# Patient Record
Sex: FEMALE | Race: ASIAN | NOT HISPANIC OR LATINO | Employment: OTHER | URBAN - METROPOLITAN AREA
[De-identification: names, ages, dates, MRNs, and addresses within clinical notes are randomized per-mention and may not be internally consistent; named-entity substitution may affect disease eponyms.]

---

## 2019-10-15 ENCOUNTER — APPOINTMENT (OUTPATIENT)
Dept: PREADMISSION TESTING | Facility: HOSPITAL | Age: 34
End: 2019-10-15

## 2019-10-15 VITALS
DIASTOLIC BLOOD PRESSURE: 70 MMHG | WEIGHT: 128 LBS | SYSTOLIC BLOOD PRESSURE: 109 MMHG | RESPIRATION RATE: 18 BRPM | OXYGEN SATURATION: 100 % | HEIGHT: 61 IN | TEMPERATURE: 98.3 F | HEART RATE: 73 BPM | BODY MASS INDEX: 24.17 KG/M2

## 2019-10-15 LAB
DEPRECATED RDW RBC AUTO: 41.3 FL (ref 37–54)
ERYTHROCYTE [DISTWIDTH] IN BLOOD BY AUTOMATED COUNT: 13.7 % (ref 12.3–15.4)
HCG SERPL QL: NEGATIVE
HCT VFR BLD AUTO: 44.4 % (ref 34–46.6)
HGB BLD-MCNC: 14.2 G/DL (ref 12–15.9)
MCH RBC QN AUTO: 26.6 PG (ref 26.6–33)
MCHC RBC AUTO-ENTMCNC: 32 G/DL (ref 31.5–35.7)
MCV RBC AUTO: 83.1 FL (ref 79–97)
PLATELET # BLD AUTO: 321 10*3/MM3 (ref 140–450)
PMV BLD AUTO: 10.3 FL (ref 6–12)
RBC # BLD AUTO: 5.34 10*6/MM3 (ref 3.77–5.28)
WBC NRBC COR # BLD: 7.13 10*3/MM3 (ref 3.4–10.8)

## 2019-10-15 PROCEDURE — 84703 CHORIONIC GONADOTROPIN ASSAY: CPT | Performed by: SURGERY

## 2019-10-15 PROCEDURE — 36415 COLL VENOUS BLD VENIPUNCTURE: CPT

## 2019-10-15 PROCEDURE — 85027 COMPLETE CBC AUTOMATED: CPT | Performed by: SURGERY

## 2019-10-15 NOTE — DISCHARGE INSTRUCTIONS
Take the following medications the morning of surgery with a small sip of water: NONE    ARRIVE AT 1030 AM    General Instructions:  • Do not eat solid food after midnight the night before surgery.  • You may drink clear liquids day of surgery but must stop at least one hour before your hospital arrival time.  • It is beneficial for you to have a clear drink that contains carbohydrates the day of surgery.  We suggest a 12 to 20 ounce bottle of Gatorade or Powerade for non-diabetic patients or a 12 to 20 ounce bottle of G2 or Powerade Zero for diabetic patients. (Pediatric patients, are not advised to drink a 12 to 20 ounce carbohydrate drink)    Clear liquids are liquids you can see through.  Nothing red in color.     Plain water                               Sports drinks  Sodas                                   Gelatin (Jell-O)  Fruit juices without pulp such as white grape juice and apple juice  Popsicles that contain no fruit or yogurt  Tea or coffee (no cream or milk added)  Gatorade / Powerade  G2 / Powerade Zero    • Infants may have breast milk up to four hours before surgery.  • Infants drinking formula may drink formula up to six hours before surgery.   • Patients who avoid smoking, chewing tobacco and alcohol for 4 weeks prior to surgery have a reduced risk of post-operative complications.  Quit smoking as many days before surgery as you can.  • Do not smoke, use chewing tobacco or drink alcohol the day of surgery.   • If applicable bring your C-PAP/ BI-PAP machine.  • Bring any papers given to you in the doctor’s office.  • Wear clean comfortable clothes.  • Do not wear contact lenses, false eyelashes or make-up.  Bring a case for your glasses.   • Bring crutches or walker if applicable.  • Remove all piercings.  Leave jewelry and any other valuables at home.  • Hair extensions with metal clips must be removed prior to surgery.  • The Pre-Admission Testing nurse will instruct you to bring medications if  unable to obtain an accurate list in Pre-Admission Testing.        If you were given a blood bank ID arm band remember to bring it with you the day of surgery.    Preventing a Surgical Site Infection:  • For 2 to 3 days before surgery, avoid shaving with a razor because the razor can irritate skin and make it easier to develop an infection.    • Any areas of open skin can increase the risk of a post-operative wound infection by allowing bacteria to enter and travel throughout the body.  Notify your surgeon if you have any skin wounds / rashes even if it is not near the expected surgical site.  The area will need assessed to determine if surgery should be delayed until it is healed.  • The night prior to surgery sleep in a clean bed with clean clothing.  Do not allow pets to sleep with you.  • Shower on the morning of surgery using a fresh bar of anti-bacterial soap (such as Dial) and clean washcloth.  Dry with a clean towel and dress in clean clothing.  • Ask your surgeon if you will be receiving antibiotics prior to surgery.  • Make sure you, your family, and all healthcare providers clean their hands with soap and water or an alcohol based hand  before caring for you or your wound.    Day of surgery:  Your arrival time is approximately two hours before your scheduled surgery time.  Upon arrival, a Pre-op nurse and Anesthesiologist will review your health history, obtain vital signs, and answer questions you may have.  The only belongings needed at this time will be a list of your home medications and if applicable your C-PAP/BI-PAP machine.  If you are staying overnight your family can leave the rest of your belongings in the car and bring them to your room later.  A Pre-op nurse will start an IV and you may receive medication in preparation for surgery, including something to help you relax.  Your family will be able to see you in the Pre-op area.  Two visitors at a time will be allowed in the Pre-op  room.  While you are in surgery your family should notify the waiting room  if they leave the waiting room area and provide a contact phone number.    Please be aware that surgery does come with discomfort.  We want to make every effort to control your discomfort so please discuss any uncontrolled symptoms with your nurse.   Your doctor will most likely have prescribed pain medications.      If you are going home after surgery you will receive individualized written care instructions before being discharged.  A responsible adult must drive you to and from the hospital on the day of your surgery and stay with you for 24 hours.    If you are staying overnight following surgery, you will be transported to your hospital room following the recovery period.  [facility] has all private rooms.    You have received a list of surgical assistants for your reference.  If you have any questions please call Pre-Admission Testing at [phone].  Deductibles and co-payments are collected on the day of service. Please be prepared to pay the required co-pay, deductible or deposit on the day of service as defined by your plan.

## 2019-10-21 ENCOUNTER — ANESTHESIA (OUTPATIENT)
Dept: PERIOP | Facility: HOSPITAL | Age: 34
End: 2019-10-21

## 2019-10-21 ENCOUNTER — ANESTHESIA EVENT (OUTPATIENT)
Dept: PERIOP | Facility: HOSPITAL | Age: 34
End: 2019-10-21

## 2019-10-21 ENCOUNTER — HOSPITAL ENCOUNTER (OUTPATIENT)
Facility: HOSPITAL | Age: 34
Setting detail: HOSPITAL OUTPATIENT SURGERY
Discharge: HOME | End: 2019-10-21
Attending: SURGERY | Admitting: SURGERY

## 2019-10-21 VITALS
RESPIRATION RATE: 18 BRPM | OXYGEN SATURATION: 93 % | TEMPERATURE: 98 F | BODY MASS INDEX: 24.02 KG/M2 | HEIGHT: 61 IN | DIASTOLIC BLOOD PRESSURE: 70 MMHG | WEIGHT: 127.21 LBS | HEART RATE: 105 BPM | SYSTOLIC BLOOD PRESSURE: 108 MMHG

## 2019-10-21 PROCEDURE — 25010000002 ONDANSETRON PER 1 MG: Performed by: NURSE ANESTHETIST, CERTIFIED REGISTERED

## 2019-10-21 PROCEDURE — 25010000002 PROPOFOL 10 MG/ML EMULSION: Performed by: NURSE ANESTHETIST, CERTIFIED REGISTERED

## 2019-10-21 PROCEDURE — 25010000002 NEOSTIGMINE PER 0.5 MG: Performed by: NURSE ANESTHETIST, CERTIFIED REGISTERED

## 2019-10-21 PROCEDURE — 25010000002 FENTANYL CITRATE (PF) 100 MCG/2ML SOLUTION: Performed by: NURSE ANESTHETIST, CERTIFIED REGISTERED

## 2019-10-21 PROCEDURE — 25010000002 ROPIVACAINE PER 1 MG: Performed by: SURGERY

## 2019-10-21 PROCEDURE — 25010000003 CEFAZOLIN PER 500 MG: Performed by: SURGERY

## 2019-10-21 PROCEDURE — 25010000002 MIDAZOLAM PER 1 MG: Performed by: ANESTHESIOLOGY

## 2019-10-21 PROCEDURE — C1789 PROSTHESIS, BREAST, IMP: HCPCS | Performed by: SURGERY

## 2019-10-21 PROCEDURE — 25010000002 GENTAMICIN PER 80 MG: Performed by: SURGERY

## 2019-10-21 PROCEDURE — 25010000002 DEXAMETHASONE PER 1 MG: Performed by: SURGERY

## 2019-10-21 DEVICE — SALINE BREAST IMPLANT WITH DIAPHRAGM VALVE, 300CC  SMOOTH ROUND SALINE
Type: IMPLANTABLE DEVICE | Site: BREAST | Status: FUNCTIONAL
Brand: MENTOR SMOOTH ROUND MODERATE PROFILE

## 2019-10-21 RX ORDER — FENTANYL CITRATE 50 UG/ML
50 INJECTION, SOLUTION INTRAMUSCULAR; INTRAVENOUS
Status: DISCONTINUED | OUTPATIENT
Start: 2019-10-21 | End: 2019-10-21 | Stop reason: HOSPADM

## 2019-10-21 RX ORDER — HYDROMORPHONE HYDROCHLORIDE 1 MG/ML
0.5 INJECTION, SOLUTION INTRAMUSCULAR; INTRAVENOUS; SUBCUTANEOUS
Status: DISCONTINUED | OUTPATIENT
Start: 2019-10-21 | End: 2019-10-21 | Stop reason: HOSPADM

## 2019-10-21 RX ORDER — PROMETHAZINE HYDROCHLORIDE 25 MG/ML
12.5 INJECTION, SOLUTION INTRAMUSCULAR; INTRAVENOUS ONCE AS NEEDED
Status: DISCONTINUED | OUTPATIENT
Start: 2019-10-21 | End: 2019-10-21 | Stop reason: HOSPADM

## 2019-10-21 RX ORDER — ACETAMINOPHEN 325 MG/1
650 TABLET ORAL ONCE AS NEEDED
Status: DISCONTINUED | OUTPATIENT
Start: 2019-10-21 | End: 2019-10-21 | Stop reason: HOSPADM

## 2019-10-21 RX ORDER — PROMETHAZINE HYDROCHLORIDE 25 MG/ML
6.25 INJECTION, SOLUTION INTRAMUSCULAR; INTRAVENOUS
Status: DISCONTINUED | OUTPATIENT
Start: 2019-10-21 | End: 2019-10-21 | Stop reason: HOSPADM

## 2019-10-21 RX ORDER — CELECOXIB 200 MG/1
200 CAPSULE ORAL ONCE
Status: COMPLETED | OUTPATIENT
Start: 2019-10-21 | End: 2019-10-21

## 2019-10-21 RX ORDER — FAMOTIDINE 10 MG/ML
20 INJECTION, SOLUTION INTRAVENOUS ONCE
Status: COMPLETED | OUTPATIENT
Start: 2019-10-21 | End: 2019-10-21

## 2019-10-21 RX ORDER — ROCURONIUM BROMIDE 10 MG/ML
INJECTION, SOLUTION INTRAVENOUS AS NEEDED
Status: DISCONTINUED | OUTPATIENT
Start: 2019-10-21 | End: 2019-10-21 | Stop reason: SURG

## 2019-10-21 RX ORDER — LIDOCAINE HYDROCHLORIDE 20 MG/ML
INJECTION, SOLUTION INFILTRATION; PERINEURAL AS NEEDED
Status: DISCONTINUED | OUTPATIENT
Start: 2019-10-21 | End: 2019-10-21 | Stop reason: SURG

## 2019-10-21 RX ORDER — SODIUM CHLORIDE, SODIUM LACTATE, POTASSIUM CHLORIDE, CALCIUM CHLORIDE 600; 310; 30; 20 MG/100ML; MG/100ML; MG/100ML; MG/100ML
9 INJECTION, SOLUTION INTRAVENOUS CONTINUOUS
Status: DISCONTINUED | OUTPATIENT
Start: 2019-10-21 | End: 2019-10-21 | Stop reason: HOSPADM

## 2019-10-21 RX ORDER — MIDAZOLAM HYDROCHLORIDE 1 MG/ML
2 INJECTION INTRAMUSCULAR; INTRAVENOUS
Status: DISCONTINUED | OUTPATIENT
Start: 2019-10-21 | End: 2019-10-21 | Stop reason: HOSPADM

## 2019-10-21 RX ORDER — HYDROCODONE BITARTRATE AND ACETAMINOPHEN 7.5; 325 MG/1; MG/1
1 TABLET ORAL ONCE AS NEEDED
Status: DISCONTINUED | OUTPATIENT
Start: 2019-10-21 | End: 2019-10-21 | Stop reason: HOSPADM

## 2019-10-21 RX ORDER — MIDAZOLAM HYDROCHLORIDE 1 MG/ML
1 INJECTION INTRAMUSCULAR; INTRAVENOUS
Status: DISCONTINUED | OUTPATIENT
Start: 2019-10-21 | End: 2019-10-21 | Stop reason: HOSPADM

## 2019-10-21 RX ORDER — HYDRALAZINE HYDROCHLORIDE 20 MG/ML
5 INJECTION INTRAMUSCULAR; INTRAVENOUS
Status: DISCONTINUED | OUTPATIENT
Start: 2019-10-21 | End: 2019-10-21 | Stop reason: HOSPADM

## 2019-10-21 RX ORDER — CYCLOBENZAPRINE HCL 10 MG
10 TABLET ORAL ONCE
Status: COMPLETED | OUTPATIENT
Start: 2019-10-21 | End: 2019-10-21

## 2019-10-21 RX ORDER — CELECOXIB 200 MG/1
400 CAPSULE ORAL ONCE
Status: COMPLETED | OUTPATIENT
Start: 2019-10-21 | End: 2019-10-21

## 2019-10-21 RX ORDER — PROMETHAZINE HYDROCHLORIDE 25 MG/1
25 SUPPOSITORY RECTAL ONCE AS NEEDED
Status: DISCONTINUED | OUTPATIENT
Start: 2019-10-21 | End: 2019-10-21 | Stop reason: HOSPADM

## 2019-10-21 RX ORDER — ACETAMINOPHEN 325 MG/1
975 TABLET ORAL ONCE
Status: COMPLETED | OUTPATIENT
Start: 2019-10-21 | End: 2019-10-21

## 2019-10-21 RX ORDER — SODIUM CHLORIDE 0.9 % (FLUSH) 0.9 %
3 SYRINGE (ML) INJECTION EVERY 12 HOURS SCHEDULED
Status: DISCONTINUED | OUTPATIENT
Start: 2019-10-21 | End: 2019-10-21 | Stop reason: HOSPADM

## 2019-10-21 RX ORDER — CLINDAMYCIN PHOSPHATE 900 MG/50ML
900 INJECTION INTRAVENOUS ONCE
Status: COMPLETED | OUTPATIENT
Start: 2019-10-21 | End: 2019-10-21

## 2019-10-21 RX ORDER — SODIUM CHLORIDE 9 MG/ML
INJECTION, SOLUTION INTRAVENOUS AS NEEDED
Status: DISCONTINUED | OUTPATIENT
Start: 2019-10-21 | End: 2019-10-21 | Stop reason: HOSPADM

## 2019-10-21 RX ORDER — PROMETHAZINE HYDROCHLORIDE 25 MG/1
12.5 TABLET ORAL ONCE AS NEEDED
Status: DISCONTINUED | OUTPATIENT
Start: 2019-10-21 | End: 2019-10-21 | Stop reason: HOSPADM

## 2019-10-21 RX ORDER — ONDANSETRON 2 MG/ML
4 INJECTION INTRAMUSCULAR; INTRAVENOUS ONCE AS NEEDED
Status: DISCONTINUED | OUTPATIENT
Start: 2019-10-21 | End: 2019-10-21 | Stop reason: HOSPADM

## 2019-10-21 RX ORDER — DOCUSATE SODIUM 100 MG/1
100 CAPSULE, LIQUID FILLED ORAL ONCE
Status: DISCONTINUED | OUTPATIENT
Start: 2019-10-21 | End: 2019-10-21 | Stop reason: HOSPADM

## 2019-10-21 RX ORDER — GLYCOPYRROLATE 0.2 MG/ML
INJECTION INTRAMUSCULAR; INTRAVENOUS AS NEEDED
Status: DISCONTINUED | OUTPATIENT
Start: 2019-10-21 | End: 2019-10-21 | Stop reason: SURG

## 2019-10-21 RX ORDER — SODIUM CHLORIDE 0.9 % (FLUSH) 0.9 %
3-10 SYRINGE (ML) INJECTION AS NEEDED
Status: DISCONTINUED | OUTPATIENT
Start: 2019-10-21 | End: 2019-10-21 | Stop reason: HOSPADM

## 2019-10-21 RX ORDER — CYCLOBENZAPRINE HCL 10 MG
5 TABLET ORAL ONCE
Status: COMPLETED | OUTPATIENT
Start: 2019-10-21 | End: 2019-10-21

## 2019-10-21 RX ORDER — LIDOCAINE HYDROCHLORIDE 10 MG/ML
0.5 INJECTION, SOLUTION EPIDURAL; INFILTRATION; INTRACAUDAL; PERINEURAL ONCE AS NEEDED
Status: DISCONTINUED | OUTPATIENT
Start: 2019-10-21 | End: 2019-10-21 | Stop reason: HOSPADM

## 2019-10-21 RX ORDER — PROPOFOL 10 MG/ML
VIAL (ML) INTRAVENOUS AS NEEDED
Status: DISCONTINUED | OUTPATIENT
Start: 2019-10-21 | End: 2019-10-21 | Stop reason: SURG

## 2019-10-21 RX ORDER — ONDANSETRON 2 MG/ML
INJECTION INTRAMUSCULAR; INTRAVENOUS AS NEEDED
Status: DISCONTINUED | OUTPATIENT
Start: 2019-10-21 | End: 2019-10-21 | Stop reason: SURG

## 2019-10-21 RX ORDER — HYDROMORPHONE HYDROCHLORIDE 1 MG/ML
0.25 INJECTION, SOLUTION INTRAMUSCULAR; INTRAVENOUS; SUBCUTANEOUS
Status: DISCONTINUED | OUTPATIENT
Start: 2019-10-21 | End: 2019-10-21 | Stop reason: HOSPADM

## 2019-10-21 RX ORDER — DIPHENHYDRAMINE HYDROCHLORIDE 50 MG/ML
12.5 INJECTION INTRAMUSCULAR; INTRAVENOUS
Status: DISCONTINUED | OUTPATIENT
Start: 2019-10-21 | End: 2019-10-21 | Stop reason: HOSPADM

## 2019-10-21 RX ORDER — HYDROXYZINE PAMOATE 50 MG/1
50 CAPSULE ORAL ONCE
Status: COMPLETED | OUTPATIENT
Start: 2019-10-21 | End: 2019-10-21

## 2019-10-21 RX ORDER — FLUMAZENIL 0.1 MG/ML
0.2 INJECTION INTRAVENOUS AS NEEDED
Status: DISCONTINUED | OUTPATIENT
Start: 2019-10-21 | End: 2019-10-21 | Stop reason: HOSPADM

## 2019-10-21 RX ORDER — SCOLOPAMINE TRANSDERMAL SYSTEM 1 MG/1
1 PATCH, EXTENDED RELEASE TRANSDERMAL ONCE
Status: DISCONTINUED | OUTPATIENT
Start: 2019-10-21 | End: 2019-10-21 | Stop reason: HOSPADM

## 2019-10-21 RX ORDER — FENTANYL CITRATE 50 UG/ML
INJECTION, SOLUTION INTRAMUSCULAR; INTRAVENOUS AS NEEDED
Status: DISCONTINUED | OUTPATIENT
Start: 2019-10-21 | End: 2019-10-21 | Stop reason: SURG

## 2019-10-21 RX ORDER — MEPERIDINE HYDROCHLORIDE 25 MG/ML
12.5 INJECTION INTRAMUSCULAR; INTRAVENOUS; SUBCUTANEOUS
Status: DISCONTINUED | OUTPATIENT
Start: 2019-10-21 | End: 2019-10-21 | Stop reason: HOSPADM

## 2019-10-21 RX ORDER — NALOXONE HCL 0.4 MG/ML
0.2 VIAL (ML) INJECTION AS NEEDED
Status: DISCONTINUED | OUTPATIENT
Start: 2019-10-21 | End: 2019-10-21 | Stop reason: HOSPADM

## 2019-10-21 RX ORDER — DEXAMETHASONE SODIUM PHOSPHATE 4 MG/ML
8 INJECTION, SOLUTION INTRA-ARTICULAR; INTRALESIONAL; INTRAMUSCULAR; INTRAVENOUS; SOFT TISSUE ONCE
Status: COMPLETED | OUTPATIENT
Start: 2019-10-21 | End: 2019-10-21

## 2019-10-21 RX ORDER — ONDANSETRON 4 MG/1
4 TABLET, FILM COATED ORAL ONCE AS NEEDED
Status: DISCONTINUED | OUTPATIENT
Start: 2019-10-21 | End: 2019-10-21 | Stop reason: HOSPADM

## 2019-10-21 RX ORDER — LABETALOL HYDROCHLORIDE 5 MG/ML
5 INJECTION, SOLUTION INTRAVENOUS
Status: DISCONTINUED | OUTPATIENT
Start: 2019-10-21 | End: 2019-10-21 | Stop reason: HOSPADM

## 2019-10-21 RX ORDER — PROMETHAZINE HYDROCHLORIDE 25 MG/1
25 TABLET ORAL ONCE AS NEEDED
Status: DISCONTINUED | OUTPATIENT
Start: 2019-10-21 | End: 2019-10-21 | Stop reason: HOSPADM

## 2019-10-21 RX ORDER — DIPHENHYDRAMINE HCL 25 MG
25 CAPSULE ORAL
Status: DISCONTINUED | OUTPATIENT
Start: 2019-10-21 | End: 2019-10-21 | Stop reason: HOSPADM

## 2019-10-21 RX ORDER — OXYCODONE HYDROCHLORIDE AND ACETAMINOPHEN 5; 325 MG/1; MG/1
2 TABLET ORAL ONCE AS NEEDED
Status: DISCONTINUED | OUTPATIENT
Start: 2019-10-21 | End: 2019-10-21 | Stop reason: HOSPADM

## 2019-10-21 RX ADMIN — CLINDAMYCIN PHOSPHATE 900 MG: 900 INJECTION INTRAVENOUS at 12:30

## 2019-10-21 RX ADMIN — ROCURONIUM BROMIDE 50 MG: 10 INJECTION INTRAVENOUS at 12:22

## 2019-10-21 RX ADMIN — FENTANYL CITRATE 50 MCG: 50 INJECTION INTRAMUSCULAR; INTRAVENOUS at 12:22

## 2019-10-21 RX ADMIN — NEOSTIGMINE METHYLSULFATE 3 MG: 1 INJECTION INTRAMUSCULAR; INTRAVENOUS; SUBCUTANEOUS at 14:09

## 2019-10-21 RX ADMIN — DEXAMETHASONE SODIUM PHOSPHATE 8 MG: 4 INJECTION, SOLUTION INTRAMUSCULAR; INTRAVENOUS at 10:41

## 2019-10-21 RX ADMIN — SODIUM CHLORIDE, POTASSIUM CHLORIDE, SODIUM LACTATE AND CALCIUM CHLORIDE: 600; 310; 30; 20 INJECTION, SOLUTION INTRAVENOUS at 13:34

## 2019-10-21 RX ADMIN — SCOPOLAMINE 1 PATCH: 1 PATCH TRANSDERMAL at 10:41

## 2019-10-21 RX ADMIN — CYCLOBENZAPRINE 10 MG: 10 TABLET, FILM COATED ORAL at 10:40

## 2019-10-21 RX ADMIN — ACETAMINOPHEN 975 MG: 325 TABLET, FILM COATED ORAL at 10:41

## 2019-10-21 RX ADMIN — GLYCOPYRROLATE 0.6 MG: 0.2 INJECTION INTRAMUSCULAR; INTRAVENOUS at 14:09

## 2019-10-21 RX ADMIN — SODIUM CHLORIDE, POTASSIUM CHLORIDE, SODIUM LACTATE AND CALCIUM CHLORIDE 9 ML/HR: 600; 310; 30; 20 INJECTION, SOLUTION INTRAVENOUS at 10:41

## 2019-10-21 RX ADMIN — CELECOXIB 400 MG: 200 CAPSULE ORAL at 10:41

## 2019-10-21 RX ADMIN — CYCLOBENZAPRINE 5 MG: 10 TABLET, FILM COATED ORAL at 15:06

## 2019-10-21 RX ADMIN — LIDOCAINE HYDROCHLORIDE 100 MG: 20 INJECTION, SOLUTION INFILTRATION; PERINEURAL at 12:22

## 2019-10-21 RX ADMIN — FENTANYL CITRATE 50 MCG: 50 INJECTION INTRAMUSCULAR; INTRAVENOUS at 14:19

## 2019-10-21 RX ADMIN — MIDAZOLAM 1 MG: 1 INJECTION INTRAMUSCULAR; INTRAVENOUS at 12:11

## 2019-10-21 RX ADMIN — PROPOFOL 200 MG: 10 INJECTION, EMULSION INTRAVENOUS at 12:22

## 2019-10-21 RX ADMIN — HYDROXYZINE PAMOATE 50 MG: 50 CAPSULE ORAL at 10:41

## 2019-10-21 RX ADMIN — FENTANYL CITRATE 50 MCG: 50 INJECTION INTRAMUSCULAR; INTRAVENOUS at 12:43

## 2019-10-21 RX ADMIN — ONDANSETRON 4 MG: 2 INJECTION INTRAMUSCULAR; INTRAVENOUS at 14:15

## 2019-10-21 RX ADMIN — CELECOXIB 200 MG: 200 CAPSULE ORAL at 15:01

## 2019-10-21 RX ADMIN — FAMOTIDINE 20 MG: 10 INJECTION INTRAVENOUS at 10:53

## 2019-10-21 RX ADMIN — FENTANYL CITRATE 50 MCG: 50 INJECTION INTRAMUSCULAR; INTRAVENOUS at 12:35

## 2019-10-21 NOTE — ANESTHESIA POSTPROCEDURE EVALUATION
"Patient: Malachi Sanches    Procedure Summary     Date:  10/21/19 Room / Location:  [facility] OR  / [facility] MAIN OR    Anesthesia Start:  1216 Anesthesia Stop:  1429    Procedure:  BILATERAL AUGMENETATION MAMMOPLASTY (Bilateral Breast) Diagnosis:      Surgeon:  GEOVANNI Blanca MD Provider:  John Grajeda MD    Anesthesia Type:  general ASA Status:  1          Anesthesia Type: general  Last vitals  BP   99/60 (10/21/19 1541)   Temp   36.7 °C (98 °F) (10/21/19 1510)   Pulse   110 (10/21/19 1541)   Resp   16 (10/21/19 1541)     SpO2   92 % (10/21/19 1541)     Post Anesthesia Care and Evaluation    Patient location during evaluation: bedside  Patient participation: complete - patient participated  Level of consciousness: awake  Pain management: adequate  Airway patency: patent  Anesthetic complications: No anesthetic complications    Cardiovascular status: acceptable  Respiratory status: acceptable  Hydration status: acceptable    Comments: BP 99/60   Pulse 110   Temp 36.7 °C (98 °F) (Oral)   Resp 16   Ht 154.9 cm (61\")   Wt 57.7 kg (127 lb 3.3 oz)   SpO2 92%   BMI 24.04 kg/m²         "

## 2019-10-21 NOTE — DISCHARGE INSTRUCTIONS
SEDATION DISCHARGE INSTRUCTIONS.    IMPORTANT: The following information will help you return to your best level of health.  GENERAL ANESTHESIA.  You have had general anesthesia. You were given a medicine to help you go to sleep and not feel pain.    Follow these instructions:   Go right home. Rest quietly at home today, then you can be up and about.   Do not drink alcohol, drive or operate machinery for 24 hours.   Do not make any important decisions or sign any legal papers in the next 24 hours.   Have a RESPONSIBLE PERSON stay with you the rest of today and overnight for your protection and safety.   Start your diet with fluids and light foods (jello, soup, juice, toast). Then eat a normal diet if not nauseated.    Call your doctor if you have:   any blue or gray skin color.   repeated vomiting.   trouble breathing.   any new problems or concerns.      SEE DR SARKAR INSTRUCTION SHEET

## 2019-10-21 NOTE — OP NOTE
Preoperative diagnosis: Bilateral micromastia  Postoperative diagnosis: Same  Procedure: Bilateral augmentation mammoplasty subpectoral with [place] round smooth saline implants 300 to 325 cc size filled to 325 cc bilaterally  Surgeon: Mp Blanca MD  Assistant: Yumiko WALTER  Anesthesia: General endotracheal  Estimated blood loss 35 cc  Complications none apparent  Drains none  Indications for procedure: This patient came to the office requesting augmentation mammoplasty she has had one child she tried on sizers with a [place] sizing system and like the 350 cc size this was a bit big for her frame given her narrow chest diameter and I recommended she use a 300 cc implant filled to 325 and she agrees she communicates with this through her sister-in-law who acts as  as she speaks Citizen of [place] and English is only fair she was reviewed the informed consent from [facility] for augmentation mammoplasty by her sister-in-law I made quite clear that she and her sister-in-law understand there is risk of infection bleeding poor wound healing scarring capsular contracture implant failure and that if implants get infected they might need to be removed that additional surgeries can cost additional money and none of this is covered under insurance.  I answered all of her questions she requested that we put a larger implant but I told her I would not in the 300-3 25 would be the best overall fit for her narrow chest size and it would be difficult to go much bigger and she agreed with this and agreed to proceed ahead with a 300 to 325 cc device and absolutely wanted it to 325 cc bilaterally.  I reviewed this again today in the preoperative area with her and her sister-in-law and she had no further questions and she is marked prior to surgery.  Procedure: The patient is brought to the operating room.  She is placed on the operating table in supine position and satisfactory general  endotracheal anesthesia achieved without difficulty we injected dilute local anesthesia about both breast and allow this work several minutes while she is prepped and draped thoroughly we placed Tegaderms over the nipple areolar complexes throughout the entirety of the case.  We made inframammary crease incisions and dissected down and release the pectoral muscle inferiorly we dissected the subpectoral pocket on each side cauterizing bleeders as we went we obtained excellent hemostasis cauterizing the cut muscle edges thoroughly we irrigated with triple antibiotic containing saline as well as 50% Betadine and inspected the pockets on multiple occasions we also had anesthesia give several deep positive pressure ventilation breaths and we kept inspected until the pockets were quite dry and hemostasis was excellent.  We made a final rinse with our antibiotic containing saline the pocket dissection was symmetrical we also used R 50% Betadine and painted in the skin with additional Betadine.  Using a fresh pair powder free gloves we opened Bitely 300 to 325 cc smooth saline implants they were free from any gross defects all air was evacuated we carefully placed them into the pocket using an introduction sleeve never allowing them to touch the skin and always handling them with a fresh pair powder free gloves.  We then filled the implants with a closed system technique filling them to a final fill volume of 325 cc on each side bilaterally.  Remove remaining air we remove the fill tubes to fill tube plugs were seated into position the pocket dissection was symmetrical and excellent we irrigated with additional triple antibiotic containing saline and suctioned this away and it was clear there was no evidence of bleeding we then closed the deep layer with 2-0 Vicryl suture and added a mixture of lidocaine with epinephrine and Marcaine with epinephrine to each pocket as we closed we closed subcutaneous layer with 3-0 Vicryl  4-0 Vicryl in the deep dermis and then a 4-0 subcuticular pullout Prolene suture was placed in 2 interrupted Prolene sutures were placed placed Steri-Strips across the incision and gauze and Tegaderms we padded the breast with ABD pads and gauze and then wrapped with 6 inch Ace wrap for compressive dressing.  She tolerated procedure well and with recovery in satisfactory condition needle and sponge counts were correct x2

## 2019-10-21 NOTE — ANESTHESIA PREPROCEDURE EVALUATION
Anesthesia Evaluation     Patient summary reviewed and Nursing notes reviewed                Airway   Mallampati: III  TM distance: >3 FB  Neck ROM: full  Possible difficult intubation  Dental - normal exam     Pulmonary - normal exam   Cardiovascular - normal exam        Neuro/Psych  GI/Hepatic/Renal/Endo      Musculoskeletal     Abdominal  - normal exam   Substance History      OB/GYN          Other                        Anesthesia Plan    ASA 1     general     intravenous induction   Anesthetic plan, all risks, benefits, and alternatives have been provided, discussed and informed consent has been obtained with: patient.

## 2019-10-21 NOTE — ANESTHESIA PROCEDURE NOTES
Airway  Urgency: elective    Date/Time: 10/21/2019 12:27 PM  Airway not difficult    General Information and Staff    Patient location during procedure: OR  Anesthesiologist: Damon Boogie MD  CRNA: Jessica Mayes CRNA    Indications and Patient Condition  Indications for airway management: airway protection    Preoxygenated: yes (pt pre-O2 with 100% O2)  Mask difficulty assessment: 2 - vent by mask + OA or adjuvant +/- NMBA (easy BMV )    Final Airway Details  Final airway type: endotracheal airway      Successful airway: ETT  Cuffed: yes   Successful intubation technique: direct laryngoscopy  Endotracheal tube insertion site: oral  Blade: Yomi  Blade size: 3  ETT size (mm): 7.0  Cormack-Lehane Classification: grade I - full view of glottis  Placement verified by: chest auscultation and capnometry   Cuff volume (mL): 7  Measured from: lips  ETT/EBT  to lips (cm): 20  Number of attempts at approach: 1    Additional Comments  ATOETx1. No change in dentition.

## (undated) DEVICE — SYR LUERLOK 50ML

## (undated) DEVICE — STPLR SKIN VISISTAT WD 35CT

## (undated) DEVICE — GLV SURG PREMIERPRO ORTHO LTX PF SZ8.5 BRN

## (undated) DEVICE — DRSNG SURESITE WNDW 4X4.5

## (undated) DEVICE — NDL SPINE 20G 3 1/2 YEL STRL 1P/U

## (undated) DEVICE — GOWN,NON-REINFORCED,SIRUS,SET IN SLV,XL: Brand: MEDLINE

## (undated) DEVICE — ASEPTIC FLUID TRANSFER SET: Brand: ASEPTIC FLUID TRANSFER SET

## (undated) DEVICE — SPNG GZ STRL 2S 4X4 12PLY

## (undated) DEVICE — ELECTRD BLD EXT EDGE/INSUL 6IN

## (undated) DEVICE — PK CHST BRST 40

## (undated) DEVICE — SPNG GZ WOVN 4X4IN 12PLY 10/BX STRL

## (undated) DEVICE — IRRIGATOR BULB ASEPTO 60CC STRL

## (undated) DEVICE — APPL CHLORAPREP W/TINT 26ML ORNG

## (undated) DEVICE — TUBING, SUCTION, 1/4" X 20', STRAIGHT: Brand: MEDLINE INDUSTRIES, INC.

## (undated) DEVICE — PENCL E/S HNDSWTCH SMOKEEVAC HOLSTR 10FT

## (undated) DEVICE — SOL ISO/ALC RUB 70PCT 4OZ

## (undated) DEVICE — ANTIBACTERIAL UNDYED BRAIDED (POLYGLACTIN 910), SYNTHETIC ABSORBABLE SUTURE: Brand: COATED VICRYL

## (undated) DEVICE — DRSNG PAD ABD 8X10IN STRL

## (undated) DEVICE — BNDG ELAS ELITE V/CLOSE 6IN 5YD LF STRL

## (undated) DEVICE — PAD,ABDOMINAL,8"X10",ST,LF: Brand: MEDLINE

## (undated) DEVICE — SPK PIN NSR FLUID NONVNT 2WY MINI LL LF

## (undated) DEVICE — Device: Brand: FABCO

## (undated) DEVICE — UNDYED MONOFILAMENT (POLYDIOXANONE), ABSORBABLE SYNTHETIC SURGICAL SUTURE: Brand: PDS

## (undated) DEVICE — 1010 S-DRAPE TOWEL DRAPE 10/BX: Brand: STERI-DRAPE™

## (undated) DEVICE — SYS ENSING FLUID M/ ADD MAC1001

## (undated) DEVICE — SKIN AFFIX SURG ADHESIVE 72/CS 0.55ML: Brand: MEDLINE

## (undated) DEVICE — ELECTRD BLD EDGE/INSUL1P 2.4X5.1MM STRL

## (undated) DEVICE — 3M™ STERI-STRIP™ REINFORCED ADHESIVE SKIN CLOSURES, R1547, 1/2 IN X 4 IN (12 MM X 100 MM), 6 STRIPS/ENVELOPE: Brand: 3M™ STERI-STRIP™